# Patient Record
Sex: FEMALE | Race: WHITE | Employment: STUDENT | URBAN - METROPOLITAN AREA
[De-identification: names, ages, dates, MRNs, and addresses within clinical notes are randomized per-mention and may not be internally consistent; named-entity substitution may affect disease eponyms.]

---

## 2018-12-31 ENCOUNTER — HOSPITAL ENCOUNTER (OUTPATIENT)
Dept: LAB | Age: 23
Discharge: HOME | End: 2018-12-31
Attending: OBSTETRICS & GYNECOLOGY
Payer: COMMERCIAL

## 2018-12-31 LAB
BASOPHILS # BLD: 0 K/UL (ref 0–0.1)
BASOPHILS NFR BLD: 1 % (ref 0–2)
DIFFERENTIAL METHOD BLD: NORMAL
EOSINOPHIL # BLD: 0.1 K/UL (ref 0–0.4)
EOSINOPHIL NFR BLD: 1 % (ref 0–5)
ERYTHROCYTE [DISTWIDTH] IN BLOOD BY AUTOMATED COUNT: 12.7 % (ref 11.6–14.5)
HCG SERPL QL: NEGATIVE
HCT VFR BLD AUTO: 39.6 % (ref 35–45)
HGB BLD-MCNC: 13.6 G/DL (ref 12–16)
LYMPHOCYTES # BLD: 1.7 K/UL (ref 0.9–3.6)
LYMPHOCYTES NFR BLD: 24 % (ref 21–52)
MCH RBC QN AUTO: 30.6 PG (ref 24–34)
MCHC RBC AUTO-ENTMCNC: 34.3 G/DL (ref 31–37)
MCV RBC AUTO: 89.2 FL (ref 74–97)
MONOCYTES # BLD: 0.6 K/UL (ref 0.05–1.2)
MONOCYTES NFR BLD: 8 % (ref 3–10)
NEUTS SEG # BLD: 4.5 K/UL (ref 1.8–8)
NEUTS SEG NFR BLD: 66 % (ref 40–73)
PLATELET # BLD AUTO: 252 K/UL (ref 135–420)
PMV BLD AUTO: 10.8 FL (ref 9.2–11.8)
RBC # BLD AUTO: 4.44 M/UL (ref 4.2–5.3)
WBC # BLD AUTO: 6.8 K/UL (ref 4.6–13.2)

## 2018-12-31 PROCEDURE — 84703 CHORIONIC GONADOTROPIN ASSAY: CPT

## 2018-12-31 PROCEDURE — 85025 COMPLETE CBC W/AUTO DIFF WBC: CPT

## 2018-12-31 PROCEDURE — 36415 COLL VENOUS BLD VENIPUNCTURE: CPT

## 2019-01-02 NOTE — H&P (VIEW-ONLY)
OBGYN [address]  [address] Tel: [phone] Fax: [phone] PATIENT:  [employer] YOB: 1995 DATE:   12/21/2018 10:00 AM  
VISIT TYPE: Pre-Operative Visit Assessment/Plan # Detail Type Description 1. Assessment Pelvic pain in female (R10.2). Patient Plan   Follow up 2 weeks postop 2. Assessment Menometrorrhagia (N92.1). Patient Plan see #1 3. Assessment Dysmenorrhea (N94.6). Patient Plan see #1 4. 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 Patient Plan the patient had foot surgeries in the past without any issues. She was cleared by her hematologist for the procedure without any need for any additional treatments prior to This 26year old female presents for Pre-op visit;. 
 
History of Present Illness: 1. Pre-op visit; The symptoms began on 12/21/2018 and generally lasts 1 Hour. The symptoms are reported as being mild. The symptoms occur constantly. The location is uterine. The symptoms are described as painless. Aggravating factors include menorrhagia, dysmenorrhea. Relieving factors include none. She states the symptoms are acute and are of new onset. Patient is schedule for [facility] D&C and diagnostic laparoscopy on 1/3 at [facility] due to menorrhagia, dysmenorrhea and pelvic pain. she c/o new onset constant sharp RLQ pain. The procedure, (diagnostic laparoscopy, hysteroscopy, D&C) was discussed with the patient in detail including the risks of, but not limited to, bleeding, infection, injury to adjacent organs, DVT, need for additional procedures, unforeseen events and the risk of anesthesia. all questions were answered. Gynecologic History: 
Patient is Premenopausal.  
Past Systemic HistoryMedical History (Detailed) Disease Onset Date Comments Dysmenorrhea Surgical History/Management (Detailed) Management Date Comments achilles tendon debridment, right ankle 2017   
heel spur resection, right foot 2017 Tonsillectomy Diagnostics History: 
Status Study Ordered Completed Interpretation  Result / Report  
completed Foot 3 Views 2016     
completed Foot 3 Views 2016   No acute fractures or dislocations. No bone tumors or cysts are appreciated, no gross malalignment is noted. Patient's bone quality is within normal limits. ordered Foot 3 Views 2016      
ordered Foot 3 Views LT 2015      
ordered TRANSVAGINAL US, NON-OB 01/15/2018      
completed X-RAY EXAM OF SHOULDER BLADE RT shoulder 2015     
completed X-RAY EXAM OF SHOULDER Min 2 Views RT shoulder 2015 PROBLEM LIST:   Problem List reviewed. Medications (active prior to today) Medication Instructions Start Date Stop Date Refilled Elsewhere Vyvanse  //   Lexie Novak Flonase  //   Lexie Novak [place] 0.15 mg-0.03 mg tablet take 1 active tablet by oral route  every day. Take active pills continuously. Discard placebo pills 2018 N Medication Reconciliation Medications reconciled today. Allergies: 
Ingredient Reaction (Severity) Medication Name Comment AMOXICILLIN TRIHYDRATE  Augmentin PENICILLINS     
POTASSIUM CLAVULANATE  Augmentin PROTEIN C, HUMAN  Ceprotin ([place]) Family History  (Detailed) Relationship Family Member Name  Age at Death Condition Onset Age Cause of Death Family history of Blood disorder  N Family history of Diabetes mellitus  N Family history of Stroke  N Family history of Hypertension  N Family history of Congenital heart disease  N Family history of Cancer, breast  N Social History:  (Detailed) Preferred language is [place]. Smoking status: Never smoker. TOBACCO/VAPING EXPOSURE No passive smoke exposure. ALCOHOL There is no history of alcohol use. CAFFEINE The patient uses caffeine: coffee - 1 cup a day. Review of Systems System Neg/Pos Details Constitutional Negative Chills, Fatigue, Fever and Weight loss. Respiratory Negative Dyspnea. Cardio Negative Chest pain. GI Negative Abdominal pain and Change in stool pattern.  Negative Dysuria, Hematuria and Urinary frequency. Integumentary Negative Pruritus and Rash. MS Negative Back pain. Reproductive Positive Dysmenorrhea, Menorrhagia, The patient is pre-menopausal, Pelvic pain. Reproductive Negative Irregular menses, Ovarian cysts and Vaginal discharge. Vital Signs Gynecologic History Patient is Premenopausal.   
 
Height Time ft in cm Last Measured Height Position 10:25 AM 5.0 6.00 167.64 08/21/2017 0 Weight/BSA/BMI Time lb oz kg Context BMI kg/m2 BSA m2  
10:25 .00  73.936  26.31 1.86 Blood Pressure Time BP mm/Hg Position Side Site Method Cuff Size 10:25 /70 sitting right arm manual adult Measured By Time Measured by  
10:25 AM Seth Franklin Physical Exam 
Exam Findings Details Constitutional Normal No acute distress. Well nourished. Well developed. Respiratory Normal Inspection - Normal. Effort - Normal.  
Abdomen Normal Patient is not obese. Genitourinary * Pelvic deferred. Psychiatric Normal Orientation - Oriented to time, place, person & situation. Appropriate mood and affect. Medications (Added, Continued or Stopped today): 
Start Date Medication Directions PRN Status PRN Reason Instruction Stop Date 01/02/2019 Cytotec 200 mcg tablet insert 1 tablets vaginally night prior to procedure N Flonase  N     
08/14/2018 Mariela 0.15 mg-0.03 mg tablet take 1 active tablet by oral route  every day. Take active pills continuously. Discard placebo pills N Vyvanse  N The patient was checked out at 11:19 AM by Moises Sims. Active Patient Care Team Members Name Contact Agency Type Support Role Relationship Active Date Inactive Date Specialty Nikko Ibrahim   Patient provider PCP Provider:  
Jasmin Rodas 01/02/2019 4:12 PM  
Document generated by:  Jean Snider 01/02/2019 04:12 PM

## 2019-01-02 NOTE — H&P
[address]  [address]  Tel: [phone]  Fax: [phone]      PATIENT:  Alonso Alvarado Call  YOB: 1995  DATE:   12/21/2018 10:00 AM   VISIT TYPE: Pre-Operative Visit        Assessment/Plan  # Detail Type Description    1. Assessment Pelvic pain in female (R10.2). Patient Plan   Follow up 2 weeks postop         2. Assessment Menometrorrhagia (N92.1). Patient Plan see #1         3. Assessment Dysmenorrhea (N94.6). Patient Plan see #1         4. 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 Patient Plan the patient had foot surgeries in the past without any issues. She was cleared by her hematologist for the procedure without any need for any additional treatments prior to          This 26year old female presents for Pre-op visit;.    History of Present Illness:  1. Pre-op visit; The symptoms began on 12/21/2018 and generally lasts 1 Hour. The symptoms are reported as being mild. The symptoms occur constantly. The location is uterine. The symptoms are described as painless. Aggravating factors include menorrhagia, dysmenorrhea. Relieving factors include none. She states the symptoms are acute and are of new onset. Patient is schedule for [facility] D[facility]C and diagnostic laparoscopy on 1/3 at [facility] due to menorrhagia, dysmenorrhea and pelvic pain. she c/o new onset constant sharp RLQ pain. The procedure, (diagnostic laparoscopy, hysteroscopy, D&C) was discussed with the patient in detail including the risks of, but not limited to, bleeding, infection, injury to adjacent organs, DVT, need for additional procedures, unforeseen events and the risk of anesthesia. all questions were answered.           Gynecologic History:  Patient is Premenopausal.   Past Systemic History    Medical History (Detailed)  Disease Onset Date Comments   Dysmenorrhea         Surgical History/Management (Detailed)  Management Date Comments   achilles tendon debridment, right ankle 2017    heel spur resection, right foot 2017    Tonsillectomy     Diagnostics History:  Status Study Ordered Completed Interpretation  Result / Report   completed Foot 3 Views 2016      completed Foot 3 Views 2016   No acute fractures or dislocations. No bone tumors or cysts are appreciated, no gross malalignment is noted. Patient's bone quality is within normal limits. ordered Foot 3 Views 2016       ordered Foot 3 Views LT 2015       ordered TRANSVAGINAL US, NON-OB 01/15/2018       completed X-RAY EXAM OF SHOULDER BLADE RT shoulder 2015      completed X-RAY EXAM OF SHOULDER Min 2 Views RT shoulder 2015            PROBLEM LIST:   Problem List reviewed. Medications (active prior to today)  Medication Instructions Start Date Stop Date Refilled Elsewhere   Vyvanse  //   Y   Flonase  //   Y   Mariela 0.15 mg-0.03 mg tablet take 1 active tablet by oral route  every day. Take active pills continuously. Discard placebo pills 2018 N     Medication Reconciliation  Medications reconciled today. Allergies:  Ingredient Reaction (Severity) Medication Name Comment   AMOXICILLIN TRIHYDRATE  Augmentin    PENICILLINS      POTASSIUM CLAVULANATE  Augmentin    PROTEIN C, HUMAN  Ceprotin ([place])        Family History  (Detailed)  Relationship Family Member Name  Age at Death Condition Onset Age Cause of Death       Family history of Blood disorder  N       Family history of Diabetes mellitus  N       Family history of Stroke  N       Family history of Hypertension  N       Family history of Congenital heart disease  N       Family history of Cancer, breast  N     Social History:  (Detailed)  Preferred language is English. Smoking status: Never smoker. TOBACCO/VAPING EXPOSURE  No passive smoke exposure. ALCOHOL  There is no history of alcohol use.    CAFFEINE  The patient uses caffeine: coffee - 1 cup a day. Review of Systems  System Neg/Pos Details   Constitutional Negative Chills, Fatigue, Fever and Weight loss. Respiratory Negative Dyspnea. Cardio Negative Chest pain. GI Negative Abdominal pain and Change in stool pattern.  Negative Dysuria, Hematuria and Urinary frequency. Integumentary Negative Pruritus and Rash. MS Negative Back pain. Reproductive Positive Dysmenorrhea, Menorrhagia, The patient is pre-menopausal, Pelvic pain. Reproductive Negative Irregular menses, Ovarian cysts and Vaginal discharge. Vital Signs     Gynecologic History  Patient is Premenopausal.      Height  Time ft in cm Last Measured Height Position   10:25 AM 5.0 6.00 167.64 08/21/2017 0     Weight/BSA/BMI  Time lb oz kg Context BMI kg/m2 BSA m2   10:25 .00  73.936  26.31 1.86     Blood Pressure  Time BP mm/Hg Position Side Site Method Cuff Size   10:25 /70 sitting right arm manual adult     Measured By  Time Measured by   10:25 AM Breana Cade       Physical Exam  Exam Findings Details   Constitutional Normal No acute distress. Well nourished. Well developed. Respiratory Normal Inspection - Normal. Effort - Normal.   Abdomen Normal Patient is not obese. Genitourinary * Pelvic deferred. Psychiatric Normal Orientation - Oriented to time, place, person & situation. Appropriate mood and affect. Medications (Added, Continued or Stopped today):  Start Date Medication Directions PRN Status PRN Reason Instruction Stop Date   01/02/2019 Cytotec 200 mcg tablet insert 1 tablets vaginally night prior to procedure N       Flonase  N      08/14/2018 [place] 0.15 mg-0.03 mg tablet take 1 active tablet by oral route  every day. Take active pills continuously. Discard placebo pills N       Vyvanse  N            The patient was checked out at 11:19 AM by Domenic Cohen.   Active Patient Care Team Members    Name Contact Agency Type Support Role Relationship Active Date Inactive Date Specialty   Randene Cancer   Patient provider PCP        Provider:   Meek Rodas 01/02/2019 4:12 PM   Document generated by:  Tanisha Figueroa 01/02/2019 04:12 PM

## 2019-01-03 ENCOUNTER — ANESTHESIA (OUTPATIENT)
Dept: SURGERY | Age: 24
End: 2019-01-03
Payer: COMMERCIAL

## 2019-01-03 ENCOUNTER — HOSPITAL ENCOUNTER (OUTPATIENT)
Age: 24
Setting detail: OUTPATIENT SURGERY
Discharge: HOME | End: 2019-01-03
Attending: OBSTETRICS & GYNECOLOGY | Admitting: OBSTETRICS & GYNECOLOGY
Payer: COMMERCIAL

## 2019-01-03 ENCOUNTER — ANESTHESIA EVENT (OUTPATIENT)
Dept: SURGERY | Age: 24
End: 2019-01-03
Payer: COMMERCIAL

## 2019-01-03 VITALS
RESPIRATION RATE: 16 BRPM | WEIGHT: 164.13 LBS | SYSTOLIC BLOOD PRESSURE: 118 MMHG | HEIGHT: 66 IN | DIASTOLIC BLOOD PRESSURE: 63 MMHG | HEART RATE: 55 BPM | OXYGEN SATURATION: 100 % | TEMPERATURE: 99.1 F | BODY MASS INDEX: 26.38 KG/M2

## 2019-01-03 PROBLEM — R10.2 PELVIC PAIN: Status: RESOLVED | Noted: 2019-01-03 | Resolved: 2019-01-03

## 2019-01-03 PROBLEM — N92.1 MENOMETRORRHAGIA: Status: RESOLVED | Noted: 2019-01-03 | Resolved: 2019-01-03

## 2019-01-03 PROBLEM — R10.2 PELVIC PAIN: Status: ACTIVE | Noted: 2019-01-03

## 2019-01-03 PROBLEM — N92.1 MENOMETRORRHAGIA: Status: ACTIVE | Noted: 2019-01-03

## 2019-01-03 LAB — HCG UR QL: NEGATIVE

## 2019-01-03 PROCEDURE — 77030002933 HC SUT MCRYL J&J -A: Performed by: OBSTETRICS & GYNECOLOGY

## 2019-01-03 PROCEDURE — 88305 TISSUE EXAM BY PATHOLOGIST: CPT

## 2019-01-03 PROCEDURE — 77030008602 HC TRCR ENDOSC EPATH J&J -B: Performed by: OBSTETRICS & GYNECOLOGY

## 2019-01-03 PROCEDURE — 74011250636 HC RX REV CODE- 250/636

## 2019-01-03 PROCEDURE — 74011250636 HC RX REV CODE- 250/636: Performed by: OBSTETRICS & GYNECOLOGY

## 2019-01-03 PROCEDURE — 76060000033 HC ANESTHESIA 1 TO 1.5 HR: Performed by: OBSTETRICS & GYNECOLOGY

## 2019-01-03 PROCEDURE — 74011000250 HC RX REV CODE- 250

## 2019-01-03 PROCEDURE — 77030008603 HC TRCR ENDOSC EPATH J&J -C: Performed by: OBSTETRICS & GYNECOLOGY

## 2019-01-03 PROCEDURE — 77030011294 HC FCPS BPLR MCR J&J -B: Performed by: OBSTETRICS & GYNECOLOGY

## 2019-01-03 PROCEDURE — 81025 URINE PREGNANCY TEST: CPT

## 2019-01-03 PROCEDURE — 77030020255 HC SOL INJ LR 1000ML BG: Performed by: OBSTETRICS & GYNECOLOGY

## 2019-01-03 PROCEDURE — 74011000250 HC RX REV CODE- 250: Performed by: OBSTETRICS & GYNECOLOGY

## 2019-01-03 PROCEDURE — 77030020782 HC GWN BAIR PAWS FLX 3M -B: Performed by: OBSTETRICS & GYNECOLOGY

## 2019-01-03 PROCEDURE — 76210000006 HC OR PH I REC 0.5 TO 1 HR: Performed by: OBSTETRICS & GYNECOLOGY

## 2019-01-03 PROCEDURE — 77030032490 HC SLV COMPR SCD KNE COVD -B: Performed by: OBSTETRICS & GYNECOLOGY

## 2019-01-03 PROCEDURE — 76010000149 HC OR TIME 1 TO 1.5 HR: Performed by: OBSTETRICS & GYNECOLOGY

## 2019-01-03 PROCEDURE — 77030034849: Performed by: OBSTETRICS & GYNECOLOGY

## 2019-01-03 PROCEDURE — 77030039266 HC ADH SKN EXOFIN S2SG -A: Performed by: OBSTETRICS & GYNECOLOGY

## 2019-01-03 PROCEDURE — 74011250637 HC RX REV CODE- 250/637: Performed by: ANESTHESIOLOGY

## 2019-01-03 PROCEDURE — 76210000026 HC REC RM PH II 1 TO 1.5 HR: Performed by: OBSTETRICS & GYNECOLOGY

## 2019-01-03 RX ORDER — GLYCOPYRROLATE 0.2 MG/ML
INJECTION INTRAMUSCULAR; INTRAVENOUS AS NEEDED
Status: DISCONTINUED | OUTPATIENT
Start: 2019-01-03 | End: 2019-01-03 | Stop reason: HOSPADM

## 2019-01-03 RX ORDER — INSULIN LISPRO 100 [IU]/ML
INJECTION, SOLUTION INTRAVENOUS; SUBCUTANEOUS ONCE
Status: DISCONTINUED | OUTPATIENT
Start: 2019-01-03 | End: 2019-01-03 | Stop reason: HOSPADM

## 2019-01-03 RX ORDER — ONDANSETRON 2 MG/ML
4 INJECTION INTRAMUSCULAR; INTRAVENOUS ONCE
Status: DISCONTINUED | OUTPATIENT
Start: 2019-01-03 | End: 2019-01-03 | Stop reason: HOSPADM

## 2019-01-03 RX ORDER — SODIUM CHLORIDE, SODIUM LACTATE, POTASSIUM CHLORIDE, CALCIUM CHLORIDE 600; 310; 30; 20 MG/100ML; MG/100ML; MG/100ML; MG/100ML
125 INJECTION, SOLUTION INTRAVENOUS CONTINUOUS
Status: DISCONTINUED | OUTPATIENT
Start: 2019-01-03 | End: 2019-01-03 | Stop reason: HOSPADM

## 2019-01-03 RX ORDER — HYDROMORPHONE HYDROCHLORIDE 2 MG/ML
0.5 INJECTION, SOLUTION INTRAMUSCULAR; INTRAVENOUS; SUBCUTANEOUS
Status: DISCONTINUED | OUTPATIENT
Start: 2019-01-03 | End: 2019-01-03 | Stop reason: HOSPADM

## 2019-01-03 RX ORDER — MAGNESIUM SULFATE 100 %
4 CRYSTALS MISCELLANEOUS AS NEEDED
Status: DISCONTINUED | OUTPATIENT
Start: 2019-01-03 | End: 2019-01-03 | Stop reason: HOSPADM

## 2019-01-03 RX ORDER — NEOSTIGMINE METHYLSULFATE 5 MG/5 ML
SYRINGE (ML) INTRAVENOUS AS NEEDED
Status: DISCONTINUED | OUTPATIENT
Start: 2019-01-03 | End: 2019-01-03 | Stop reason: HOSPADM

## 2019-01-03 RX ORDER — BUPIVACAINE HYDROCHLORIDE 2.5 MG/ML
INJECTION, SOLUTION EPIDURAL; INFILTRATION; INTRACAUDAL AS NEEDED
Status: DISCONTINUED | OUTPATIENT
Start: 2019-01-03 | End: 2019-01-03 | Stop reason: HOSPADM

## 2019-01-03 RX ORDER — SCOLOPAMINE TRANSDERMAL SYSTEM 1 MG/1
1 PATCH, EXTENDED RELEASE TRANSDERMAL
Status: DISCONTINUED | OUTPATIENT
Start: 2019-01-03 | End: 2019-01-03 | Stop reason: HOSPADM

## 2019-01-03 RX ORDER — DEXTROSE 50 % IN WATER (D50W) INTRAVENOUS SYRINGE
25-50 AS NEEDED
Status: DISCONTINUED | OUTPATIENT
Start: 2019-01-03 | End: 2019-01-03 | Stop reason: HOSPADM

## 2019-01-03 RX ORDER — FENTANYL CITRATE 50 UG/ML
25 INJECTION, SOLUTION INTRAMUSCULAR; INTRAVENOUS
Status: ACTIVE | OUTPATIENT
Start: 2019-01-03 | End: 2019-01-03

## 2019-01-03 RX ORDER — FENTANYL CITRATE 50 UG/ML
INJECTION, SOLUTION INTRAMUSCULAR; INTRAVENOUS AS NEEDED
Status: DISCONTINUED | OUTPATIENT
Start: 2019-01-03 | End: 2019-01-03 | Stop reason: HOSPADM

## 2019-01-03 RX ORDER — ACETAMINOPHEN 325 MG/1
650 TABLET ORAL
Status: DISCONTINUED | OUTPATIENT
Start: 2019-01-03 | End: 2019-01-03 | Stop reason: HOSPADM

## 2019-01-03 RX ORDER — SODIUM CHLORIDE 0.9 % (FLUSH) 0.9 %
5-10 SYRINGE (ML) INJECTION EVERY 8 HOURS
Status: DISCONTINUED | OUTPATIENT
Start: 2019-01-03 | End: 2019-01-03 | Stop reason: HOSPADM

## 2019-01-03 RX ORDER — OXYCODONE AND ACETAMINOPHEN 5; 325 MG/1; MG/1
2 TABLET ORAL
Status: DISCONTINUED | OUTPATIENT
Start: 2019-01-03 | End: 2019-01-03 | Stop reason: HOSPADM

## 2019-01-03 RX ORDER — PHENYLEPHRINE HCL IN 0.9% NACL 1 MG/10 ML
SYRINGE (ML) INTRAVENOUS AS NEEDED
Status: DISCONTINUED | OUTPATIENT
Start: 2019-01-03 | End: 2019-01-03 | Stop reason: HOSPADM

## 2019-01-03 RX ORDER — SODIUM CHLORIDE 0.9 % (FLUSH) 0.9 %
5-10 SYRINGE (ML) INJECTION AS NEEDED
Status: DISCONTINUED | OUTPATIENT
Start: 2019-01-03 | End: 2019-01-03 | Stop reason: HOSPADM

## 2019-01-03 RX ORDER — ONDANSETRON 2 MG/ML
INJECTION INTRAMUSCULAR; INTRAVENOUS AS NEEDED
Status: DISCONTINUED | OUTPATIENT
Start: 2019-01-03 | End: 2019-01-03 | Stop reason: HOSPADM

## 2019-01-03 RX ORDER — SODIUM CHLORIDE, SODIUM LACTATE, POTASSIUM CHLORIDE, CALCIUM CHLORIDE 600; 310; 30; 20 MG/100ML; MG/100ML; MG/100ML; MG/100ML
50 INJECTION, SOLUTION INTRAVENOUS CONTINUOUS
Status: DISCONTINUED | OUTPATIENT
Start: 2019-01-03 | End: 2019-01-03 | Stop reason: HOSPADM

## 2019-01-03 RX ORDER — LIDOCAINE HYDROCHLORIDE 20 MG/ML
INJECTION, SOLUTION EPIDURAL; INFILTRATION; INTRACAUDAL; PERINEURAL AS NEEDED
Status: DISCONTINUED | OUTPATIENT
Start: 2019-01-03 | End: 2019-01-03 | Stop reason: HOSPADM

## 2019-01-03 RX ORDER — NALOXONE HYDROCHLORIDE 0.4 MG/ML
0.1 INJECTION, SOLUTION INTRAMUSCULAR; INTRAVENOUS; SUBCUTANEOUS
Status: DISCONTINUED | OUTPATIENT
Start: 2019-01-03 | End: 2019-01-03 | Stop reason: HOSPADM

## 2019-01-03 RX ORDER — ONDANSETRON 2 MG/ML
4 INJECTION INTRAMUSCULAR; INTRAVENOUS
Status: DISCONTINUED | OUTPATIENT
Start: 2019-01-03 | End: 2019-01-03 | Stop reason: HOSPADM

## 2019-01-03 RX ORDER — OXYCODONE AND ACETAMINOPHEN 5; 325 MG/1; MG/1
1 TABLET ORAL AS NEEDED
Status: DISCONTINUED | OUTPATIENT
Start: 2019-01-03 | End: 2019-01-03 | Stop reason: HOSPADM

## 2019-01-03 RX ORDER — PROPOFOL 10 MG/ML
INJECTION, EMULSION INTRAVENOUS AS NEEDED
Status: DISCONTINUED | OUTPATIENT
Start: 2019-01-03 | End: 2019-01-03 | Stop reason: HOSPADM

## 2019-01-03 RX ORDER — DEXAMETHASONE SODIUM PHOSPHATE 4 MG/ML
INJECTION, SOLUTION INTRA-ARTICULAR; INTRALESIONAL; INTRAMUSCULAR; INTRAVENOUS; SOFT TISSUE AS NEEDED
Status: DISCONTINUED | OUTPATIENT
Start: 2019-01-03 | End: 2019-01-03 | Stop reason: HOSPADM

## 2019-01-03 RX ORDER — MIDAZOLAM HYDROCHLORIDE 1 MG/ML
INJECTION, SOLUTION INTRAMUSCULAR; INTRAVENOUS AS NEEDED
Status: DISCONTINUED | OUTPATIENT
Start: 2019-01-03 | End: 2019-01-03 | Stop reason: HOSPADM

## 2019-01-03 RX ORDER — OXYCODONE AND ACETAMINOPHEN 5; 325 MG/1; MG/1
1 TABLET ORAL
Status: DISCONTINUED | OUTPATIENT
Start: 2019-01-03 | End: 2019-01-03 | Stop reason: HOSPADM

## 2019-01-03 RX ORDER — ROCURONIUM BROMIDE 10 MG/ML
INJECTION, SOLUTION INTRAVENOUS AS NEEDED
Status: DISCONTINUED | OUTPATIENT
Start: 2019-01-03 | End: 2019-01-03 | Stop reason: HOSPADM

## 2019-01-03 RX ADMIN — GLYCOPYRROLATE 0.2 MG: 0.2 INJECTION INTRAMUSCULAR; INTRAVENOUS at 13:47

## 2019-01-03 RX ADMIN — ROCURONIUM BROMIDE 30 MG: 10 INJECTION, SOLUTION INTRAVENOUS at 12:55

## 2019-01-03 RX ADMIN — SCOLOPAMINE TRANSDERMAL SYSTEM 1 PATCH: 1 PATCH, EXTENDED RELEASE TRANSDERMAL at 13:35

## 2019-01-03 RX ADMIN — PROPOFOL 150 MG: 10 INJECTION, EMULSION INTRAVENOUS at 12:55

## 2019-01-03 RX ADMIN — SODIUM CHLORIDE, SODIUM LACTATE, POTASSIUM CHLORIDE, AND CALCIUM CHLORIDE: 600; 310; 30; 20 INJECTION, SOLUTION INTRAVENOUS at 13:40

## 2019-01-03 RX ADMIN — FENTANYL CITRATE 50 MCG: 50 INJECTION, SOLUTION INTRAMUSCULAR; INTRAVENOUS at 13:20

## 2019-01-03 RX ADMIN — FENTANYL CITRATE 50 MCG: 50 INJECTION, SOLUTION INTRAMUSCULAR; INTRAVENOUS at 13:26

## 2019-01-03 RX ADMIN — LIDOCAINE HYDROCHLORIDE 60 MG: 20 INJECTION, SOLUTION EPIDURAL; INFILTRATION; INTRACAUDAL; PERINEURAL at 12:55

## 2019-01-03 RX ADMIN — SODIUM CHLORIDE, SODIUM LACTATE, POTASSIUM CHLORIDE, AND CALCIUM CHLORIDE 125 ML/HR: 600; 310; 30; 20 INJECTION, SOLUTION INTRAVENOUS at 11:59

## 2019-01-03 RX ADMIN — Medication 100 MCG: at 13:06

## 2019-01-03 RX ADMIN — Medication 1.5 MG: at 13:47

## 2019-01-03 RX ADMIN — DEXAMETHASONE SODIUM PHOSPHATE 4 MG: 4 INJECTION, SOLUTION INTRA-ARTICULAR; INTRALESIONAL; INTRAMUSCULAR; INTRAVENOUS; SOFT TISSUE at 13:07

## 2019-01-03 RX ADMIN — ONDANSETRON 4 MG: 2 INJECTION INTRAMUSCULAR; INTRAVENOUS at 13:07

## 2019-01-03 RX ADMIN — MIDAZOLAM HYDROCHLORIDE 2 MG: 1 INJECTION, SOLUTION INTRAMUSCULAR; INTRAVENOUS at 12:47

## 2019-01-03 RX ADMIN — FENTANYL CITRATE 100 MCG: 50 INJECTION, SOLUTION INTRAMUSCULAR; INTRAVENOUS at 12:55

## 2019-01-03 NOTE — DISCHARGE INSTRUCTIONS
Patient Education   Patient Education        Pelvic Laparoscopy: What to Expect at Home  Your Recovery    After surgery, it is normal to have a sore belly, cramping, or pain around the cuts the doctor made (incisions) for up to 4 days. You can expect to feel better and stronger each day, although you may get tired quickly and need pain medicine for a few days. Some people are able to return to work the day after surgery, while others need to recover for a few days to a few weeks before going back to work. Sometimes pressure from the gas used during surgery causes other side effects. You may have pain in your neck or shoulders. Or you may feel pressure on your bladder and need to urinate more often than usual. These side effects should go away in less than 4 days. Do not lift anything heavy while you are recovering so that your incisions can heal.  This care sheet gives you a general idea about how long it will take for you to recover. But each person recovers at a different pace. Follow the steps below to get better as quickly as possible. How can you care for yourself at home? Activity    · Rest when you feel tired. Getting enough sleep will help you recover.     · Try to walk each day. Start out by walking a little more than you did the day before. Bit by bit, increase the amount you walk. Walking boosts blood flow and helps prevent pneumonia and constipation.     · For 1 week, avoid lifting anything that would make you strain. This may include a child, heavy grocery bags and milk containers, a heavy briefcase or backpack, cat litter or dog food bags, or a vacuum .     · Avoid strenuous activities, such as biking, jogging, weight lifting, and aerobic exercise, for 1 week.     · You may shower. Pat the incisions dry when you are done. Do not take a bath for the first week after surgery or until your doctor tells you it is okay.     · You may have some light vaginal bleeding. Wear sanitary pads if needed. Do not douche or use tampons.     · You may drive when you are no longer taking prescription pain medicine and can quickly move your foot from the gas pedal to the brake. You must also be able to sit comfortably for a long period of time, even if you do not plan to go far. You might get caught in traffic.     · You may need to take a few days to a few weeks off work. It depends on the type of work you do and how you feel.     · Do not have sex until your doctor tells you it is okay. Diet    · You can eat your normal diet. If your stomach is upset, try bland, low-fat foods like plain rice, broiled chicken, toast, and yogurt.     · Drink plenty of fluids (unless your doctor tells you not to).     · You may notice that your bowel movements are not regular right after your surgery. This is common. Try to avoid constipation and straining with bowel movements. You may want to take a fiber supplement every day. If you have not had a bowel movement after a couple of days, ask your doctor about taking a mild laxative. Medicines    · Your doctor will tell you if and when you can restart your medicines. He or she will also give you instructions about taking any new medicines.     · If you take blood thinners, such as warfarin (Coumadin), clopidogrel (Plavix), or aspirin, be sure to talk to your doctor. He or she will tell you if and when to start taking those medicines again. Make sure that you understand exactly what your doctor wants you to do.     · Be safe with medicines. Take pain medicines exactly as directed. ? If the doctor gave you a prescription medicine for pain, take it as prescribed. ? If you are not taking a prescription pain medicine, take an over-the-counter medicine such as acetaminophen (Tylenol), ibuprofen (Advil, Motrin), or naproxen (Aleve). Read and follow all instructions on the label. ? Do not take two or more pain medicines at the same time unless the doctor told you to.  Many pain medicines contain acetaminophen, which is Tylenol. Too much acetaminophen (Tylenol) can be harmful.     · If you think your pain medicine is making you sick to your stomach:  ? Take your medicine after meals (unless your doctor tells you not to). ? Ask your doctor for a different pain medicine.     · If your doctor prescribed antibiotics, take them as directed. Do not stop taking them just because you feel better. You need to take the full course of antibiotics. Incision care    · If you have strips of tape on the incisions, leave the tape on for a week or until it falls off.     · Wash the area daily with warm, soapy water and pat it dry.     · Keep the area clean and dry. You may cover it with a gauze bandage if it weeps or rubs against clothing. Change the bandage every day. Other instructions    · Wear loose, comfortable clothing, and avoid anything that puts pressure on your belly, such as a girdle, for a few weeks.     · You may want to use a heating pad on your belly to help with pain. Follow-up care is a key part of your treatment and safety. Be sure to make and go to all appointments, and call your doctor if you are having problems. It's also a good idea to know your test results and keep a list of the medicines you take. When should you call for help? Call 911 anytime you think you may need emergency care. For example, call if:    · You passed out (lost consciousness).     · You have chest pain, are short of breath, or cough up blood.    Call your doctor now or seek immediate medical care if:    · You have bright red vaginal bleeding that soaks one or more pads in an hour, or you have large clots.     · You are sick to your stomach or cannot drink fluids.     · You have vaginal discharge that has increased in amount or smells bad.     · You have pain that does not get better after you take pain medicine.     · You have signs of infection, such as:  ? Increased pain, swelling, warmth, or redness. ?  Red streaks leading from the incision. ? Pus draining from the incision. ? A fever.     · You have loose stitches, or your incisions come open.     · Bright red blood has soaked through the bandages over your incisions.     · You have signs of a blood clot in your leg (called a deep vein thrombosis), such as:  ? Pain in your calf, back of the knee, thigh, or groin. ? Redness and swelling in your leg.     · You cannot pass stools or gas.    Watch closely for changes in your health, and be sure to contact your doctor if you have any problems. Where can you learn more? Go to http://cali-aman.info/. Enter I530 in the search box to learn more about \"Pelvic Laparoscopy: What to Expect at Home. \"  Current as of: May 15, 2018  Content Version: 11.8  © 3934-7076 [employer]. Care instructions adapted under license by [employer] (which disclaims liability or warranty for this information). If you have questions about a medical condition or this instruction, always ask your healthcare professional. [place] [address] any warranty or liability for your use of this information. Hysteroscopy: What to Expect at [address] hysteroscopy is a procedure to find and treat problems with your uterus. It may have been done to remove growths from the uterus. Or it may have been done to diagnose or treat fertility problems. It can also be used to diagnose or treat abnormal bleeding. If the doctor filled your uterus with air, you may have gas pains or your belly may feel full. You may have cramps and light vaginal bleeding for a few days to several weeks. The cramps and bleeding may last longer if the hysteroscopy was used for treatment. You may also have shoulder pain right after the procedure. If the doctor filled your uterus with liquid, you may have watery vaginal discharge for several weeks.   This care sheet gives you a general idea about how long it will take for you to recover. But each person recovers at a different pace. Follow the steps below to get better as quickly as possible. How can you care for yourself at home? Activity    · Rest when you feel tired.     · You can do your normal activities when it feels okay to do so.     · You may shower and take baths as usual.     · Ask your doctor when it is okay for you to have sex. Diet    · You can eat your normal diet. If your stomach is upset, try bland, low-fat foods like plain rice, broiled chicken, toast, and yogurt.     · If your bowel movements are not regular right after surgery, try to avoid constipation and straining. Drink plenty of water. Your doctor may suggest fiber, a stool softener, or a mild laxative. Medicines    · Your doctor will tell you if and when you can restart your medicines. He or she will also give you instructions about taking any new medicines.     · If you take aspirin or some other blood thinner, be sure to talk to your doctor. He or she will tell you if and when to start taking this medicine again. Make sure that you understand exactly what your doctor wants you to do.     · Be safe with medicines. Read and follow all instructions on the label. ? If the doctor gave you a prescription medicine for pain, take it as prescribed. ? If you are not taking a prescription pain medicine, ask your doctor if you can take an over-the-counter medicine.     · If your doctor prescribed antibiotics, take them as directed. Do not stop taking them just because you feel better. You need to take the full course of antibiotics. Other instructions    · You may have some light vaginal bleeding. Wear sanitary pads if needed. Do not use tampons until your doctor says it is okay.     · You may want to use a heating pad on your belly to help with pain. Use a low heat setting.     · Talk about birth control with your doctor. Do not try to become pregnant until your doctor says it is okay. Follow-up care is a key part of your treatment and safety. Be sure to make and go to all appointments, and call your doctor if you are having problems. It's also a good idea to know your test results and keep a list of the medicines you take. When should you call for help? Call 911 anytime you think you may need emergency care. For example, call if:    · You passed out (lost consciousness).     · You have chest pain, are short of breath, or cough up blood.    Call your doctor now or seek immediate medical care if:    · You have pain that does not get better after you take pain medicine.     · You cannot pass stools or gas.     · You have vaginal discharge that has increased in amount or smells bad.     · You are sick to your stomach or cannot drink fluids.     · You have signs of infection, such as:  ? Increased pain, swelling, warmth, or redness. ? A fever.     · You have bright red vaginal bleeding that soaks one or more pads in an hour, or you have large clots.     · You have signs of a blood clot in your leg (called a deep vein thrombosis), such as:  ? Pain in your calf, back of the knee, thigh, or groin. ? Redness and swelling in your leg.    Watch closely for changes in your health, and be sure to contact your doctor if you have any problems. Where can you learn more? Go to http://cali-aman.info/. DISCHARGE SUMMARY from Nurse    PATIENT INSTRUCTIONS:    After general anesthesia or intravenous sedation, for 24 hours or while taking prescription Narcotics:  · Limit your activities  · Do not drive and operate hazardous machinery  · Do not make important personal or business decisions  · Do  not drink alcoholic beverages  · If you have not urinated within 8 hours after discharge, please contact your surgeon on call.     Report the following to your surgeon:  · Excessive pain, swelling, redness or odor of or around the surgical area  · Temperature over 100.5  · Nausea and vomiting lasting longer than 4 hours or if unable to take medications  · Any signs of decreased circulation or nerve impairment to extremity: change in color, persistent  numbness, tingling, coldness or increase pain  · Any questions    What to do at Home:  Recommended activity: Activity as tolerated and no driving for today,     If you experience any of the following symptoms as per above, please follow up with DR Sharyn Toussaint. *  Please give a list of your current medications to your Primary Care Provider. *  Please update this list whenever your medications are discontinued, doses are      changed, or new medications (including over-the-counter products) are added. *  Please carry medication information at all times in case of emergency situations. These are general instructions for a healthy lifestyle:    No smoking/ No tobacco products/ Avoid exposure to second hand smoke  Surgeon General's Warning:  Quitting smoking now greatly reduces serious risk to your health. Obesity, smoking, and sedentary lifestyle greatly increases your risk for illness    A healthy diet, regular physical exercise & weight monitoring are important for maintaining a healthy lifestyle    You may be retaining fluid if you have a history of heart failure or if you experience any of the following symptoms:  Weight gain of 3 pounds or more overnight or 5 pounds in a week, increased swelling in our hands or feet or shortness of breath while lying flat in bed. Please call your doctor as soon as you notice any of these symptoms; do not wait until your next office visit. Recognize signs and symptoms of STROKE:    F-face looks uneven    A-arms unable to move or move unevenly    S-speech slurred or non-existent    T-time-call 911 as soon as signs and symptoms begin-DO NOT go       Back to bed or wait to see if you get better-TIME IS BRAIN. Warning Signs of HEART ATTACK     Call 911 if you have these symptoms:   Chest discomfort.  Most heart attacks involve discomfort in the center of the chest that lasts more than a few minutes, or that goes away and comes back. It can feel like uncomfortable pressure, squeezing, fullness, or pain.  Discomfort in other areas of the upper body. Symptoms can include pain or discomfort in one or both arms, the back, neck, jaw, or stomach.  Shortness of breath with or without chest discomfort.  Other signs may include breaking out in a cold sweat, nausea, or lightheadedness. Don't wait more than five minutes to call 911 - MINUTES MATTER! Fast action can save your life. Calling 911 is almost always the fastest way to get lifesaving treatment. Emergency Medical Services staff can begin treatment when they arrive -- up to an hour sooner than if someone gets to the hospital by car. Patient armband removed and shredded  The discharge information has been reviewed with the patient and parent. The patient and parent verbalized understanding. Discharge medications reviewed with the patient and caregiver and appropriate educational materials and side effects teaching were provided. ___________________________________________________________________________________________________________________________________Enter I360 in the search box to learn more about \"Hysteroscopy: What to Expect at Home. \"  Current as of: May 15, 2018  Content Version: 11.8  © 1648-4424 [employer]. Care instructions adapted under license by [employer] (which disclaims liability or warranty for this information). If you have questions about a medical condition or this instruction, always ask your healthcare professional. [address] any warranty or liability for your use of this information.

## 2019-01-03 NOTE — ANESTHESIA PREPROCEDURE EVALUATION
Anesthetic History PONV Review of Systems / Medical History Patient summary reviewed, nursing notes reviewed and pertinent labs reviewed Pulmonary Within defined limits Neuro/Psych Within defined limits Cardiovascular Within defined limits GI/Hepatic/Renal 
Within defined limits Endo/Other Within defined limits Other Findings Physical Exam 
 
Airway Mallampati: II 
TM Distance: 4 - 6 cm Neck ROM: normal range of motion Mouth opening: Normal 
 
 Cardiovascular Regular rate and rhythm,  S1 and S2 normal,  no murmur, click, rub, or gallop Dental 
No notable dental hx Pulmonary Breath sounds clear to auscultation Abdominal 
GI exam deferred Other Findings Anesthetic Plan ASA: 1 Anesthesia type: general 
 
 
 
 
Induction: Intravenous Anesthetic plan and risks discussed with: Patient

## 2019-01-03 NOTE — ANESTHESIA POSTPROCEDURE EVALUATION
Procedure(s): DILATATION AND CURETTAGE HYSTEROSCOPY DIAGNOSTIC  LAPAROSCOPY, FULGURATION OF ENDOMETRIOSIS. Anesthesia Post Evaluation Pain management: adequate Airway patency: patent Anesthetic complications: no 
Cardiovascular status: acceptable Respiratory status: acceptable Hydration status: acceptable Post anesthesia nausea and vomiting:  controlled Visit Vitals /63 Pulse (!) 56 Temp 36.2 °C (97.1 °F) Resp 15 Ht 5' 6\" (1.676 m) Wt 74.4 kg (164 lb 2 oz) SpO2 100% BMI 26.49 kg/m²

## 2019-01-03 NOTE — OP NOTES
[facility]  [facility]    Sherron Fatima  MR#: [id number]  : 1995  ACCOUNT #: [de-identified]   DATE OF SERVICE: 2019    PREOPERATIVE DIAGNOSES:  1. Menometrorrhagia. 2.  Pelvic pain. POSTOPERATIVE DIAGNOSES:  1. Menometrorrhagia. 2.  Pelvic pain. 3.  Endometriosis. PROCEDURES PERFORMED:    1. Diagnostic hysteroscopy. 2.  Fractional dilatation and curettage. 3.  Diagnostic laparoscopy. 4.  Fulguration of endometriosis. SURGEON:  Jammie Douglass MD    ANESTHESIA:  General.    ESTIMATED BLOOD LOSS:  Minimal.    URINE OUTPUT:  450 mL of clear urine. COMPLICATIONS:  None. SPECIMENS REMOVED:  Pathology:  1. Endocervical curettings. 2.  Endometrial curettings. IMPLANTS:  None    FINDINGS:  On hysteroscopy, both tubal ostia were clearly seen. No intracavitary masses or endometrial thickening were noted. Laparoscopic findings included grossly normal uterus, ovaries and tubes. There was a small area of powder burn endometriosis on the right uterosacral ligament. No ovarian cysts or masses were noted. No other areas of endometriosis were seen. PROCEDURE PERFORMED:      PROCEDURE:  The patient was taken to the operating room, where general anesthesia was found to be adequate. She was then prepped and draped in normal sterile fashion in the dorsal lithotomy position. A sterile bivalve speculum was placed into the vagina. The anterior lip of the cervix was then grasped with a single tooth tenaculum. Endocervical curettings were then obtained. The cervix was then easily dilated to 5 mm. The diagnostic hysteroscope was then advanced into the cervix and uterus and the uterus was distended with saline solution. Upon inspection, both tubal ostia were clearly seen. No intracavitary masses were noted. The hysteroscope was then removed and a very gentle curettage was performed to obtain a sample of the endometrium.   The hysteroscope was again advanced within the cavity and the cavity was noted to be clean. The hysteroscope was again removed from the uterus. The Hulka manipulator was attached to the anterior lip of the cervix and the single tooth tenaculum was removed. A Nova catheter was placed into the bladder. Attention was then turned to the abdomen, where 0.5% Marcaine was injected into the umbilicus. A 5 mm incision was made with a scalpel. The Veress needle was advanced into the abdomen. Intra-abdominal placement was confirmed with a water filled syringe. The abdomen was then insufflated with CO2 gas. Once an adequate pneumoperitoneum was obtained, a 5 mm nonbladed trocar was advanced through the umbilicus into the abdomen under direct visualization of the laparoscope. The patient was then placed in steep Trendelenburg. Attention was then turned to the left lower quadrant, where 0.5% Marcaine was injected. A 5 mm incision was made in the left lower quadrant with a scalpel. A 5 mm nonbladed trocar was advanced into the abdominal cavity under direct visualization of the laparoscope. Upon inspection, both ovaries and tubes appeared to be grossly normal.  There were no cysts or masses. The uterus also appeared to be grossly normal.  Bladder flap and pelvic sidewalls were without any adhesions or evidence of endometriosis. There was a small amount of blood in the cul-de-sac, which was evacuated. After the blood was evacuated from the cul-de-sac, there was noted to be a small area of endometriosis on the right uterosacral ligament. No other areas were noted. Bipolar cautery was then used to fulgurate this area. The area was then irrigated with a copious amount of fluid. All instruments were then removed from the patient's abdomen. All gas was released. The umbilical and left lower quadrant incisions were closed with 4-0 Monocryl suture in a subcuticular fashion. Dermabond was also applied to the incisions.   The Nova catheter and Hulka manipulator were removed from below. Good hemostasis was noted on the anterior lip of the cervix. The patient tolerated the procedure well. All sponge and needle counts were correct. The patient was taken to recovery room in stable condition.       MD WALI Delaney /   D: 01/03/2019 14:08     T: 01/03/2019 14:52  JOB #: [id number]

## 2019-01-03 NOTE — INTERVAL H&P NOTE
H&P Update: 
Sharee Munoz was seen and examined. History and physical has been reviewed. The patient has been examined.  There have been no significant clinical changes since the completion of the originally dated History and Physical. 
 
Signed By: Yani Iverson MD   
 January 3, 2019 12:23 PM

## 2019-01-03 NOTE — PERIOP NOTES
Reviewed PTA medication list with patient/caregiver and patient/caregiver denies any additional medications. Patient admits to having a responsible adult care for them for at least 24 hours after surgery. 
  
Urine pregnancy results Negative and verified with Dalila Herrera RN.

## (undated) DEVICE — DEVON™ KNEE AND BODY STRAP 60" X 3" (1.5 M X 7.6 CM): Brand: DEVON

## (undated) DEVICE — STERILE POLYISOPRENE POWDER-FREE SURGICAL GLOVES WITH EMOLLIENT COATING: Brand: PROTEXIS

## (undated) DEVICE — SOLUTION LACTATED RINGERS INJECTION USP

## (undated) DEVICE — 40580 - THE PINK PAD - ADVANCED TRENDELENBURG POSITIONING KIT: Brand: 40580 - THE PINK PAD - ADVANCED TRENDELENBURG POSITIONING KIT

## (undated) DEVICE — D&C HYSTEROSCOPY: Brand: MEDLINE INDUSTRIES, INC.

## (undated) DEVICE — DISPOSABLE SUCTION/IRRIGATOR TUBE SET WITH TIP: Brand: AHTO

## (undated) DEVICE — KENDALL SCD EXPRESS SLEEVES, KNEE LENGTH, MEDIUM: Brand: KENDALL SCD

## (undated) DEVICE — SLEEVE TRCR L100MM DIA5MM UNIV STBL FOR BLDELSS DIL TIP

## (undated) DEVICE — SYR 10ML LUER LOK 1/5ML GRAD --

## (undated) DEVICE — APPLICATOR BNDG 1MM ADH PREMIERPRO EXOFIN

## (undated) DEVICE — GYN LAPAROSCOPY: Brand: MEDLINE INDUSTRIES, INC.

## (undated) DEVICE — REM POLYHESIVE ADULT PATIENT RETURN ELECTRODE: Brand: VALLEYLAB

## (undated) DEVICE — STERILE POLYISOPRENE POWDER-FREE SURGICAL GLOVES: Brand: PROTEXIS

## (undated) DEVICE — TRAY CATH 16FR DRN BG LF -- CONVERT TO ITEM 363158

## (undated) DEVICE — DRAPE TWL SURG 16X26IN BLU ORB04] ALLCARE INC]

## (undated) DEVICE — GOWN,AURORA,NONRNF,XL,30/CS: Brand: MEDLINE

## (undated) DEVICE — TRAY PREP DRY W/ PREM GLV 2 APPL 6 SPNG 2 UNDPD 1 OVERWRAP

## (undated) DEVICE — NEEDLE HYPO 22GA L1.5IN BLK S STL HUB POLYPR SHLD REG BVL

## (undated) DEVICE — TROCAR LAP L100MM DIA5MM BLDELSS W/ STBL SL ENDOPATH XCEL

## (undated) DEVICE — FORCEPS BPLR DIA5MM MACRO JAW LAP ENDOPATH

## (undated) DEVICE — SUT MONOCRYL PLUS UD 4-0 --